# Patient Record
Sex: MALE | Race: WHITE | Employment: FULL TIME | URBAN - METROPOLITAN AREA
[De-identification: names, ages, dates, MRNs, and addresses within clinical notes are randomized per-mention and may not be internally consistent; named-entity substitution may affect disease eponyms.]

---

## 2019-03-05 ENCOUNTER — OFFICE VISIT (OUTPATIENT)
Dept: ORTHOPEDIC SURGERY | Age: 64
End: 2019-03-05
Payer: COMMERCIAL

## 2019-03-05 VITALS — SYSTOLIC BLOOD PRESSURE: 175 MMHG | HEART RATE: 72 BPM | DIASTOLIC BLOOD PRESSURE: 87 MMHG

## 2019-03-05 DIAGNOSIS — M25.532 LEFT WRIST PAIN: Primary | ICD-10-CM

## 2019-03-05 DIAGNOSIS — S63.502A SPRAIN OF LEFT WRIST, INITIAL ENCOUNTER: ICD-10-CM

## 2019-03-05 PROCEDURE — 99203 OFFICE O/P NEW LOW 30 MIN: CPT | Performed by: ORTHOPAEDIC SURGERY

## 2019-03-05 PROCEDURE — L3908 WHO COCK-UP NONMOLDE PRE OTS: HCPCS | Performed by: ORTHOPAEDIC SURGERY

## 2019-03-05 RX ORDER — IBUPROFEN 800 MG/1
800 TABLET ORAL EVERY 8 HOURS PRN
Qty: 60 TABLET | Refills: 0 | Status: SHIPPED | OUTPATIENT
Start: 2019-03-05

## 2019-04-02 ENCOUNTER — OFFICE VISIT (OUTPATIENT)
Dept: ORTHOPEDIC SURGERY | Age: 64
End: 2019-04-02
Payer: COMMERCIAL

## 2019-04-02 DIAGNOSIS — S63.502A SPRAIN OF LEFT WRIST, INITIAL ENCOUNTER: Primary | ICD-10-CM

## 2019-04-02 DIAGNOSIS — M77.8 TENDINITIS OF LEFT WRIST: ICD-10-CM

## 2019-04-02 PROCEDURE — 99213 OFFICE O/P EST LOW 20 MIN: CPT | Performed by: ORTHOPAEDIC SURGERY

## 2019-04-03 RX ORDER — DEXAMETHASONE SODIUM PHOSPHATE 4 MG/ML
4 INJECTION, SOLUTION INTRA-ARTICULAR; INTRALESIONAL; INTRAMUSCULAR; INTRAVENOUS; SOFT TISSUE PRN
Qty: 30 ML | Refills: 0 | Status: SHIPPED | OUTPATIENT
Start: 2019-04-03

## 2019-04-03 NOTE — PROGRESS NOTES
Assessment: 67-year-old male presenting with approximately 5 week history of left ulnar-sided wrist pain next on 1. Suspect ECU tendon pathology including tendinitis versus sub-sheath injury, possible TFCC pathology        Treatment Plan: With repeat examination and do suspect a component of ECU tendinopathy related to the patient's symptoms. We did discuss the possibility of ECU tendon tear as well as other intra-articular pathology such as TFCC tear within the wrist.  The patient does also have some ulnar positive variance the could be contributing to his symptoms as well  Overall, he has experienced some improvement with intermittent resting as well as use of a brace. On examination it does seem the most his symptoms are related to the ECU tendon and I have suggested to him further conservative management consisting of therapy for ECU stretching and modification as well as iontophoresis modality. We did discuss obtaining an MRI for further evaluation in the future as well as well as operative treatment options but the patient would much prefer nonoperative treatment at this point  We will plan to escalate this most recent treatment and plan for follow-up in approximately 1 month for repeat evaluation    No follow-ups on file. History of Present Illness  Peyton Fritz is a 58 y.o. male, right-hand-dominant, here with certified  with work-related left wrist pain and injury. Date of injury: Approximately 2/1/2019  Mechanism of injury: The patient was throwing garbage when he twisted his left wrist awkwardly to lift a garbage can and let also fell awkwardly onto his wrist.  He has had ulnar-sided wrist pain since that time which she describes as occasionally radiating into his distal forearm.   I evaluated him approximately 1 month ago with symptoms consistent with ECU tendon pathology, also some ulnar foveal pain and possible intra-articular ulnar-sided pathology  The patient has been wearing a brace and modified some of his activities. He does continue to work. Overall, he feels some improvement in his symptoms. He does have some pain especially when he is not using his brace. Most of his pain is related to pronation and supination especially. He occasionally has swelling on the ulnar side of his wrist  No additional changes reported today, numbness and tingling have improved        Review of Systems  Pertinent items are noted in HPI  Review of systems reviewed from Patient History Form dated on 3/5/19 and available in the patient's chart under the Media tab. Vital Signs  There were no vitals filed for this visit. Physical Exam  Constitutional:  Patient is well-nourished and demonstrates normal hygiene. Mental Status:  Patient is alert and oriented to person, place and time. Skin:  Intact, no rashes or lesions.      Left Wrist/left upper extremity Examination:     Inspection:  Minimal swelling of the left ulnar wrist without skin changes or open wounds, no obvious deformity or asymmetry compared with right wrist     Palpation: Focal tenderness to palpation along ECU tendon sheath as well as mild ulnar fovea tenderness and tenderness near pisotriquetral joint        Range of Motion:  Near symmetrical range of motion with pronation supination with 70° and 70°, extension and flexion 60° and 70° near symmetrical to contralateral wrist  Full composite fist and extension of all fingers without discomfort   There is pain with terminal supination and combined wrist extension    Strength:  5 out of 5 strength wrist flexion and extension, finger flexion-extension abduction  5 out of 5 EPL FPL and thumb abduction     Special Tests:  Positive ECU Synergy test  Stable distal radial ulnar joint in pronation supination and neutral position with mild discomfort with palpation  Mild discomfort with axial loading and ulnar deviation of wrist  Tenderness along ECU tendon with no subluxation or instability  Globally nontender throughout remainder of wrist  Skin: There are no rashes, ulcerations or lesions. Capillary refill brisk all fingers, symmetric  Gross sensation intact to light touch median/ulnar/radial nerves  Sensation intact to radial/ulnar aspect of fingertip        Radiology:    X-rays images from 3/5/2019  Views 3  Location left wrist including pronated oblique comparison view  Impression no acute fracture or subacute osseous abnormalities. Approximately 2 mm ulnar positive variance symmetrical to contralateral wrist with prominent ulnar styloid, no evidence of DRUJ arthrosis or additional degenerative changes      Additional Diagnostic Test Findings:    Office Procedures:  Orders Placed This Encounter   Procedures    [facility] OT - [facility] Occupational Therapy     Referral Priority:   Routine     Referral Type:   Eval and Treat     Referral Reason:   Specialty Services Required     Requested Specialty:   Occupational Therapy     Number of Visits Requested:   1           Carmel Rodriguez MD  Orthopaedic Surgeon, [address]    Contact Information:  Michell Rafaelashyam: [phone] [phone] Clinical )    This dictation was performed with a verbal recognition program [facility]) and it was checked for errors. It is possible that there are still dictated errors within this office note. If so, please bring any errors to my attention for an addendum. All efforts were made to ensure that this office note is accurate.

## 2019-04-10 ENCOUNTER — TELEPHONE (OUTPATIENT)
Dept: ORTHOPEDIC SURGERY | Age: 64
End: 2019-04-10

## 2019-04-10 NOTE — TELEPHONE ENCOUNTER
RETURNED PATIENT DAUGHTER CALL- STATES SHE WENT TO [facility] TO GET DEXAMETHASONE FILLED & WAS TOLD BY PHARMACIST THEY'RE UNABLE TO FILL IT PER WORKERS COMP

## 2019-04-10 NOTE — TELEPHONE ENCOUNTER
CALLED [facility] PHARMACY SPOKE WITH CASTRO - STATES PER WORKERS COMP DEXAMETHASONE WON'T BE COVERED DUE TO DOI IS PAST  60 DAYS. CASTROCLARA RG THEY NEED A PRIOR AUTH OR EXPLANATION BY PHYSICIAN IN ORDER TO GET IT APPROVED .  STATES OUR OFFICE NEEDS TO CONTACT #[phone]

## 2019-04-12 ENCOUNTER — TELEPHONE (OUTPATIENT)
Dept: ORTHOPEDIC SURGERY | Age: 64
End: 2019-04-12

## 2019-04-15 ENCOUNTER — TELEPHONE (OUTPATIENT)
Dept: ORTHOPEDIC SURGERY | Age: 64
End: 2019-04-15

## 2019-04-23 ENCOUNTER — HOSPITAL ENCOUNTER (OUTPATIENT)
Dept: OCCUPATIONAL THERAPY | Age: 64
Setting detail: THERAPIES SERIES
Discharge: HOME | End: 2019-04-23
Payer: COMMERCIAL

## 2019-04-23 PROCEDURE — 97165 OT EVAL LOW COMPLEX 30 MIN: CPT | Performed by: OCCUPATIONAL THERAPIST

## 2019-04-23 PROCEDURE — 97140 MANUAL THERAPY 1/> REGIONS: CPT | Performed by: OCCUPATIONAL THERAPIST

## 2019-04-23 PROCEDURE — 97033 APP MDLTY 1+IONTPHRSIS EA 15: CPT | Performed by: OCCUPATIONAL THERAPIST

## 2019-04-23 PROCEDURE — 97110 THERAPEUTIC EXERCISES: CPT | Performed by: OCCUPATIONAL THERAPIST

## 2019-04-23 NOTE — PLAN OF CARE
[address] [place] [address]  [facility] [address]  Phone [phone]      Fax [phone]      Occupational Therapy/Hand Therapy Certification  Dear Referring Practitioner: Carmel Rodriguez,     We had the pleasure of evaluating the following patient for occupational therapy services at [address] [place] [address]. A summary of our findings can be found in the initial assessment below. This includes our plan of care. If you have any questions or concerns regarding these findings, please do not hesitate to contact me at the office phone number checked above. Thank you for the referral.     Physician Signature:_______________________________Date:__________________  By signing above (or electronic signature), therapists plan is approved by physician      Patient: Roberto De Luna   : 1955   MRN: [id number]  Referring Physician: Referring Practitioner: Carmel Rodriguez      Evaluation Date: 2019      Medical Diagnosis Information:  Diagnosis: L wrist sprain  S63.502A    Treatment Diagnosis: L wrist pain  M25.532                  Insurance information: OT Insurance Information: [facility]  12 visits  -19  Date of Injury: 19  Date of Surgery: NA      Precautions/ Contra-indications:  Latex Allergy:  [x]No      []Yes  Pacemaker:  [x] No       [] Yes     Preferred Language for Healthcare:   [x]English       []other:      G-Codes:  OT G-codes  Functional Assessment Tool Used: Quick DASH  Score: 20%    [x] Patient reported history, allergies, and medications reviewed - see intake form. SUBJECTIVE:  Background/Relevant Medical & Therapy History:   Pt fell while carrying garbage cans and work.   Garbage can hit L wrist    Pain Scale: 4 /10   []Constant      [x]Intermittent    [x]other: 8-9 with usage  Pain Location:  Ulnar wrist  Easing factors: resting in brace, ibuprofen  Provocative factors: usage - wrist ext, Mobility/Transfers/Gait: [x] Independent - no significant gait deviations  [] Assistance needed   [] Assistive device used: Falls Risk Assessment (30 days):   [x] Falls Risk assessed and no intervention required. [] Falls Risk assessed and Patient requires intervention due to being higher risk   TUG score (>12s at risk):     [] Falls education provided, including      Review Of Systems (ROS): [x]Performed Review of systems (Integumentary, CardioPulmonary, Neurological) by intake and observation. Intake form has been scanned into medical record. Patient has been instructed to contact their primary care physician regarding ROS issues if not already being addressed at this time. ASSESSMENT:   This patient presents with signs and symptoms consistent with the medical diagnosis provided by the referring physician. Impairments (physical, cognitive and/or psychosocial):  [x] Decreased mobility   [x] Weakness    [] Hypersensitivity   [x] Pain/tenderness   [] Edema/swelling   [] Decreased coordination (fine/gross motor)   [] Impaired body mechanics  [] Sensory loss  [] Loss of balance   [] Other:      Performance Deficits (to be addressed in plan of care):   [] Bathing    [] Household Tasks (cooking/cleaning)   [] Dressing    [] Self Feeding   [] Grooming    [x] Work/Education- pulling garbage cans   [] Functional Mobility   [] Sleeping/Rest   [] Toileting/Hygiene   [] Recreational Activities   [] Driving    [] Community/Social Participation   [x] Other:  Maintained  and lifting    Rehab Potential:   [] Excellent [x] Good [] Fair  [] Poor     Barriers affecting rehab potential:  []Age    []Lack of Motivation   []Co-Morbidities  []Cognitive Function  []Environmental/home/work barriers  []Other:     Tolerance of evaluation/treatment:    [] Excellent [x] Good [] Fair  [] Poor      PLAN OF CARE:  Interventions:   [x] Therapeutic Exercise [x] Therapeutic Activity    [x] Activities of Daily Living [x] Neuromuscular Re-education      [x] Patient Education  [x] Manual Therapy      [x] Modalities as needed, and not otherwise contraindicated, including: ultrasound,paraffin,moist heat/cold pack, electrical stimulation, contrast bath, iontophoresis  [x] Splinting    Frequency/Duration: 2 days per week for 6 weeks      GOALS:  Short Term Goals: To be achieved in: 2 weeks  1. Independent in HEP and progression per patient tolerance, in order to prevent re-injury. 2. Patient will have a decrease in pain to facilitate improvement in movement, function, and ADLs as indicated by Functional Deficits. Long Term Goals to be achieved in 6  weeks, including patient directed goals to address identified performance deficits:  1) Pt to be independent in graded HEP progression with a good level of effort and compliance. 2) Pt to report a score of 15 % or less on the Quick DASH disability questionnaire for increased performance with carrying, moving, and handling objects. 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   4) Pt will demonstrate increased strength to L  within 10# of R and wrist strength 5-/5 for improved performance maintained  and lifting allowing pt to perform job  5) Pt will have a decrease in pain to 2/10 to facilitate improvement in  performance maintained , lifting and pulling        OCCUPATIONAL THERAPY EVALUATION COMPLEXITY JUSTIFICATION:    [] An occupational profile and medical/therapy history, which includes:   [x] a brief history including medical and/or therapy records relating to the     presenting problem   [] an expanded review of medical and/or therapy records and additional review     of physical, cognitive or psychosocial history related to current functional    performance   [] an extensive additional review of review of medical and/or therapy records   and physical, cognitive, or psychosocial history related to current    functional performance    [] An assessment that identifies performance deficits (relating to physical, cognitive, or psychosocial skills) that result in activity limitations and/or participation restrictions:   [] 1-3 performance deficits   [x] 3-5 performance deficits   [] 5 or more performance deficits    [] Clinical decision making of:   [x] low complexity, including analysis of occupational profile, data analysis from problem focused assessment, and consideration of a limited number of treatment options. No comorbidities affect occupational performance. No task modifications or assistance needed to complete evaluation. [] moderate complexity, including analysis of occupational profile, data analysis from detailed assessment and consideration of several treatment options. Comorbidities that affect occupational performance may be present. Minimal to moderate task modifications or assistance needed to complete assessment. [] high complexity, including analysis of occupational profile, analysis of data from comprehensive assessment and consideration of multiple treatment options. Multiple comorbidities present that affect occupational performance. Significant task modifications or assistance needed to complete assessment. Evaluation Code:  [] Low Complexity EVAL 39704 (typically 30 minutes face to face)  [] Mod Complexity EVAL 63630 (typically 45 minutes face to face)  [] High Complexity EVAL 37639 (typically 60 minutes face to face)             Electronically signed by:   Becca Rodriguez OTR/L [address] [place] [address]  OT [id number]

## 2019-04-23 NOTE — FLOWSHEET NOTE
[address] [place], [place] [address]  Phone [phone]      Fax [phone]    Hand Therapy Daily Treatment Note  Date:  2019    Patient: Bradly Clinton   : 1955   MRN: [id number]  Referring Physician: Referring Practitioner: Mark Kinney       Medical Diagnosis Information:  Diagnosis: L wrist sprain  S63.502A    Treatment Diagnosis: L wrist pain  M25.532                                         Insurance information: OT Insurance Information: [facility]  12 visits  -19  Date of Injury:19  Date of Surgery:NA      Visit # Insurance Allowable   1 12     Date of Patient follow up with Physician:     G-Codes:  OT G-codes  Functional Assessment Tool Used: Quick DASH  Score: 20%    Progress Note: []  Yes  [x]  No  Next due by: Visit #10      Latex Allergy:  [x]NO      []YES    Preferred Language for Healthcare:   [x]English       []other:    Pain level:  4/10     SUBJECTIVE:    Background/Relevant Medical & Therapy History:   Pt fell while carrying garbage       RESTRICTIONS/PRECAUTIONS: avoid sup    OBJECTIVE:      Date: 19       Objective Measures:        Wrist   Ext/Flex            RD/UD L  65/40  14/25  pain    R  65/65  20/30       Forearm sup/pron L  60/85    R  75/90          L  20#    R 90#         MMT:   Wrist ext  Wrist flex    L  4-/5  4/5  shooting pain                     Modalities: 19         HVGS with CP  End  10\"       Ionto 120 MaMin                               Therapeutic Exercise & Activities:        Stretching FA   Wrist ext  Wrist flex  RD  UD  5X15 sec hold ea         Manual DTM  Pt painful distal 1/3 of ulnar forearm                                                                    Therapeutic Exercise and NMR:  [x] (84840) Provided verbal/tactile cueing for activities related to strengthening, flexibility, endurance, ROM  for improvements in scapular, scapulothoracic and UE control with self care, reaching, carrying, lifting, house/yardwork, driving/computer work.    [] (51157) Provided verbal/tactile cueing for activities related to improving balance, coordination, kinesthetic sense, posture, motor skill, proprioception  to assist with  scapular, scapulothoracic and UE control with self care, reaching, carrying, lifting, house/yardwork, driving/computer work.   [] Comments:    Therapeutic Activities:    [] (70854 or 93153) Provided verbal/tactile cueing for activities related to improving balance, coordination, kinesthetic sense, posture, motor skill, proprioception and motor activation to allow for proper function of scapular, scapulothoracic and UE control with self care, carrying, lifting, driving/computer work  [] Comments:    Home Exercise Program:    [x] (67780) Reviewed/Progressed HEP activities related to strengthening, flexibility, endurance, ROM of scapular, scapulothoracic and UE control with self care, reaching, carrying, lifting, house/yardwork, driving/computer work  [] (83797) Reviewed/Progressed HEP activities related to improving balance, coordination, kinesthetic sense, posture, motor skill, proprioception of scapular, scapulothoracic and UE control with self care, reaching, carrying, lifting, house/yardwork, driving/computer work    [x] Comments:handouts    Manual Treatments:  PROM / STM / Oscillations-Mobs:  G-I, II, III, IV (PA's, Inf., Post.)  [x] (21284) Provided manual therapy to mobilize soft tissue/joints of cervical/CT, scapular GHJ and UE for the purpose of modulating pain, promoting relaxation,  increasing ROM, reducing/eliminating soft tissue swelling/inflammation/restriction, improving soft tissue extensibility and allowing for proper ROM for normal function with self care, reaching, carrying, lifting, house/yardwork, driving/computer work  [] Comments:    ADL Training:  []  (00835) Provided self-care/home management training related to activities of daily living and compensatory training, and/or use of adaptive equipment   [] Comments:     Splinting:  [] Fabrication of:   [] (03480) Checkout for orthotic/prosthetic use, established patient   [] (47644) Orthotic management and training (fitting and assessment)  [] Comments:    Charges:  Timed Code Treatment Minutes: 38   Total Treatment Minutes: 65   9:20-10:25 am  [x] EVAL (LOW) 09418   [] OT Re-eval (47698)  [] EVAL (MOD) 75117   [] EVAL (HIGH) 29117       [x] Ulysses (94023) x  1 20 min  [x] ESCRB(83376) 8'  [] NMR (23692) x      [] Estim (attended) (22715)   [x] Manual ([id number]) x  1   10' [] US (52982)  [] TA (62529) x      [] Paraffin (59797)  [] ADL  ([phone]) x     [] Splint/L code:    [] Estim (unattended) (22 661984)  [] Other:      Frequency/Duration: 2 days per week for 6 weeks        GOALS:  Short Term Goals: To be achieved in: 2 weeks  1. Independent in HEP and progression per patient tolerance, in order to prevent re-injury. 2. Patient will have a decrease in pain to facilitate improvement in movement, function, and ADLs as indicated by Functional Deficits.     Long Term Goals to be achieved in 6  weeks, including patient directed goals to address identified performance deficits:  1) Pt to be independent in graded HEP progression with a good level of effort and compliance. 2) Pt to report a score of 15 % or less on the Quick DASH disability questionnaire for increased performance with carrying, moving, and handling objects. 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   4) Pt will demonstrate increased strength to L  within 10# of R and wrist strength 5-/5 for improved performance maintained  and lifting allowing pt to perform job  5) Pt will have a decrease in pain to 2/10 to facilitate improvement in  performance maintained , lifting and pulling            Progression Towards Functional goals:  [] Patient is progressing as expected towards functional goals listed. [] Progression is slowed due to complexities listed. [] Progression has been slowed due to co-morbidities. [x] Plan just implemented, too soon to assess goals progression  [] Other:     ASSESSMENT:  See eval    Treatment/Activity Tolerance:  [] Patient tolerated treatment well [] Patient limited by fatique  [] Patient limited by pain  [] Patient limited by other medical complications  [] Other:     Prognosis: [x] Good [] Fair  [] Poor    Patient Requires Follow-up: [x] Yes  [] No    PLAN: See eval  [] Continue per plan of care [] Alter current plan (see comments)  [x] Plan of care initiated [] Hold pending MD visit [] Discharge    Electronically signed by:  Danuta Roche OTR/L [place]  OT [id number]

## 2019-04-26 ENCOUNTER — HOSPITAL ENCOUNTER (OUTPATIENT)
Dept: OCCUPATIONAL THERAPY | Age: 64
Setting detail: THERAPIES SERIES
Discharge: HOME | End: 2019-04-26
Payer: COMMERCIAL

## 2019-04-26 PROCEDURE — 97033 APP MDLTY 1+IONTPHRSIS EA 15: CPT | Performed by: OCCUPATIONAL THERAPIST

## 2019-04-26 PROCEDURE — 97140 MANUAL THERAPY 1/> REGIONS: CPT | Performed by: OCCUPATIONAL THERAPIST

## 2019-04-26 PROCEDURE — 97110 THERAPEUTIC EXERCISES: CPT | Performed by: OCCUPATIONAL THERAPIST

## 2019-04-26 NOTE — FLOWSHEET NOTE
[address] [place], [place] [address]  Phone [phone]      Fax [phone]    Hand Therapy Daily Treatment Note  Date:  2019    Patient: Ciro Pratt   : 1955   MRN: [id number]  Referring Physician: Referring Practitioner: Susanne Lopez       Medical Diagnosis Information:  Diagnosis: L wrist sprain  S63.502A    Treatment Diagnosis: L wrist pain  M25.532                                         Insurance information: OT Insurance Information: [facility]  12 visits  -19  Date of Injury:19  Date of Surgery:NA      Visit # Insurance Allowable   2 12     Date of Patient follow up with Physician:     G-Codes:  OT G-codes  Functional Assessment Tool Used: Quick DASH  Score: 20%    Progress Note: []  Yes  [x]  No  Next due by: Visit #10      Latex Allergy:  [x]NO      []YES    Preferred Language for Healthcare:   [x]English       []other:    Pain level:  4/10  Current:-5/10    SUBJECTIVE:  No change in status noted per patient. Has had shooting pains in mid volar wrist since last therapy which he had not previously experienced.         Background/Relevant Medical & Therapy History:   Pt fell while carrying garbage       RESTRICTIONS/PRECAUTIONS: avoid sup    OBJECTIVE:      Date: 19       Objective Measures:        Wrist   Ext/Flex            RD/UD L  65/40  14/25  pain    R  65/65  20/30       Forearm sup/pron L  60/85    R  75/90          L  20#    R 90#         MMT:   Wrist ext  Wrist flex    L  4-/5  4/5  shooting pain                     Modalities: 19      HVGS with CP  End  10\" With HP  10'      Ionto 120 MaMin 120 MaMin                              Therapeutic Exercise & Activities:        Stretching FA   Wrist ext  Wrist flex  RD  UD  5X15 sec hold ea   FA   Wrist ext  Wrist flex  RD  UD  5X15 sec hold ea      Manual DTM  Pt painful distal 1/3 of ulnar forearm  DTM volar and dorsal FA      Ice massage  5'      Exercises  Ecc wrist flex  1#  8X  Too painful    Red putty flatten with mallet    Trial of wrist wrap with mallet and putty as well as sup/pron with dowel. Pt did not experience any pain relief with either task                                                              Therapeutic Exercise and NMR:  [x] (49313) Provided verbal/tactile cueing for activities related to strengthening, flexibility, endurance, ROM  for improvements in scapular, scapulothoracic and UE control with self care, reaching, carrying, lifting, house/yardwork, driving/computer work. [x] (19322) Provided verbal/tactile cueing for activities related to improving balance, coordination, kinesthetic sense, posture, motor skill, proprioception  to assist with  scapular, scapulothoracic and UE control with self care, reaching, carrying, lifting, house/yardwork, driving/computer work.   [] Comments:    Therapeutic Activities:    [] (47429 or 05008) Provided verbal/tactile cueing for activities related to improving balance, coordination, kinesthetic sense, posture, motor skill, proprioception and motor activation to allow for proper function of scapular, scapulothoracic and UE control with self care, carrying, lifting, driving/computer work  [] Comments:    Home Exercise Program:    [x] (23745) Reviewed/Progressed HEP activities related to strengthening, flexibility, endurance, ROM of scapular, scapulothoracic and UE control with self care, reaching, carrying, lifting, house/yardwork, driving/computer work  [] (33743) Reviewed/Progressed HEP activities related to improving balance, coordination, kinesthetic sense, posture, motor skill, proprioception of scapular, scapulothoracic and UE control with self care, reaching, carrying, lifting, house/yardwork, driving/computer work    [x] Comments:handouts    Manual Treatments:  PROM / STM / Oscillations-Mobs:  G-I, II, III, IV (PA's, Inf., Post.)  [x] (97235) and handling objects. 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 4) Pt will demonstrate increased strength to L  within 10# of R and wrist strength 5-/5 for improved performance maintained  and lifting allowing pt to perform job  5) Pt will have a decrease in pain to 2/10 to facilitate improvement in  performance maintained , lifting and pulling            Progression Towards Functional goals:  [] Patient is progressing as expected towards functional goals listed. [] Progression is slowed due to complexities listed. [] Progression has been slowed due to co-morbidities. [x] Plan just implemented, too soon to assess goals progression  [] Other:     ASSESSMENT: Pain with resisted wrist ext & flex along ECU and FCU. Pain also with sup. Feel addition of volar mid wrist pain may be due to pt applying too much pressure during stretches. Treatment/Activity Tolerance:  [] Patient tolerated treatment well [] Patient limited by fatique  [x] Patient limited by pain  [] Patient limited by other medical complications  [] Other:     Prognosis: [x] Good [] Fair  [] Poor    Patient Requires Follow-up: [x] Yes  [] No    PLAN: Progress strengthening as pain allows  [x] Continue per plan of care [] Alter current plan (see comments)  [] Plan of care initiated [] Hold pending MD visit [] Discharge    Electronically signed by:  Ran Lerner OTR/L [facility]  OT [id number]

## 2019-05-02 ENCOUNTER — HOSPITAL ENCOUNTER (OUTPATIENT)
Dept: OCCUPATIONAL THERAPY | Age: 64
Setting detail: THERAPIES SERIES
Discharge: HOME | End: 2019-05-02
Payer: COMMERCIAL

## 2019-05-02 PROCEDURE — 97140 MANUAL THERAPY 1/> REGIONS: CPT | Performed by: OCCUPATIONAL THERAPIST

## 2019-05-02 PROCEDURE — 97022 WHIRLPOOL THERAPY: CPT | Performed by: OCCUPATIONAL THERAPIST

## 2019-05-02 PROCEDURE — 97110 THERAPEUTIC EXERCISES: CPT | Performed by: OCCUPATIONAL THERAPIST

## 2019-05-02 PROCEDURE — 97033 APP MDLTY 1+IONTPHRSIS EA 15: CPT | Performed by: OCCUPATIONAL THERAPIST

## 2019-05-02 NOTE — FLOWSHEET NOTE
[address] [place], [place] [address]  Phone [phone]      Fax [phone]    Hand Therapy Daily Treatment Note  Date:  2019    Patient: Riley Brewer   : 1955   MRN: [id number]  Referring Physician: Referring Practitioner: Jesus Carney       Medical Diagnosis Information:  Diagnosis: L wrist sprain  S63.502A    Treatment Diagnosis: L wrist pain  M25.532                                         Insurance information: OT Insurance Information: [facility]  12 visits  -19  Date of Injury:19  Date of Surgery:NA      Visit # Insurance Allowable   3 12     Date of Patient follow up with Physician: 19    Functional Assessment Tool Used: Quick DASH  Score: 20% disability    Progress Note: []  Yes  [x]  No  Next due by: Visit #10      Latex Allergy:  [x]NO      []YES    Preferred Language for Healthcare:   []English       [x]other:    Pain level:  4/10  Current:-5/10    SUBJECTIVE:  Daughter present to interpret for patient. Nothing different, pain is the same. Patient reports the he has pain on the bone. Points to ulnar side of left wrist over ulnar head.   Reports that holding the broom and dustpan in left hand for about 5 minutes felt excruciating pain in left wrist.  Background/Relevant Medical & Therapy History:   Pt fell while carrying garbage       RESTRICTIONS/PRECAUTIONS: avoid sup    OBJECTIVE:      Date: 19      Objective Measures:            Wrist   Ext/Flex                RD/UD L  65/40      14/25  pain    R  65/65  20/30   70/45 9 (pain with wrist extension)  20/30                Forearm sup/pron L  60/85    R  75/90     65/85       L  20#    R 90#     50# ( 5 pain)    90#      MMT:   Wrist ext  Wrist flex    L  4-/5  4/5  shooting pain                     Modalities: 19     HVGS with CP  End  10\" With HP  10' CP end of treatment 10'     Ionto 120 MaMin 120 MaMin 120 ma/min take home patch to left ulnar wrist.        Fluidotherapy with AROM to left wrist 15'. Therapeutic Exercise & Activities:        Stretching FA   Wrist ext  Wrist flex  RD  UD  5X15 sec hold ea   FA   Wrist ext  Wrist flex  RD  UD  5X15 sec hold ea      Manual DTM  Pt painful distal 1/3 of ulnar forearm  DTM volar and dorsal FA      Ice massage  5'      Exercises  Ecc wrist flex  1#  8X  Too painful    Red putty flatten with mallet    Trial of wrist wrap with mallet and putty as well as sup/pron with dowel. Pt did not experience any pain relief with either task     1 lb. Wrist extension x 10. RD/Ud x 10, wrist flex x 10. Most pain ulnar wrist with wrist flexion gentle supinated forearm. Gripping and rotating green foam cube x 20. Isometric wrist exercises. Flex/ext, RD/UD see handout. Instructed in for home and issued green foam for resisted  below pain threshold. Therapeutic Exercise and NMR:  [x] (45124) Provided verbal/tactile cueing for activities related to strengthening, flexibility, endurance, ROM  for improvements in scapular, scapulothoracic and UE control with self care, reaching, carrying, lifting, house/yardwork, driving/computer work. [x] (74192) Provided verbal/tactile cueing for activities related to improving balance, coordination, kinesthetic sense, posture, motor skill, proprioception  to assist with  scapular, scapulothoracic and UE control with self care, reaching, carrying, lifting, house/yardwork, driving/computer work.   [] Comments:    Therapeutic Activities:    [] (17815 or 96866) Provided verbal/tactile cueing for activities related to improving balance, coordination, kinesthetic sense, posture, motor skill, proprioception and motor activation to allow for proper function of scapular, scapulothoracic and UE control with self care, carrying, lifting, driving/computer work  [] fluidotherapy      Frequency/Duration: 2 days per week for 6 weeks        GOALS:  Short Term Goals: To be achieved in: 2 weeks  1. Independent in HEP and progression per patient tolerance, in order to prevent re-injury. 2. Patient will have a decrease in pain to facilitate improvement in movement, function, and ADLs as indicated by Functional Deficits.     Long Term Goals to be achieved in 6  weeks, including patient directed goals to address identified performance deficits:  1) Pt to be independent in graded HEP progression with a good level of effort and compliance. 2) Pt to report a score of 15 % or less on the Quick DASH disability questionnaire for increased performance with carrying, moving, and handling objects. 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 4) Pt will demonstrate increased strength to L  within 10# of R and wrist strength 5-/5 for improved performance maintained  and lifting allowing pt to perform job  5) Pt will have a decrease in pain to 2/10 to facilitate improvement in  performance maintained , lifting and pulling            Progression Towards Functional goals:  [] Patient is progressing as expected towards functional goals listed. [] Progression is slowed due to complexities listed. [] Progression has been slowed due to co-morbidities. [] Plan just implemented, too soon to assess goals progression  [x] Other: Better wrist ROM and  strength but pain on ulnar side of wrist has remained unchanged.     ASSESSMENT:     Treatment/Activity Tolerance:  [] Patient tolerated treatment well [] Patient limited by fatique  [x] Patient limited by pain  [] Patient limited by other medical complications  [] Other:     Prognosis: [x] Good [] Fair  [] Poor    Patient Requires Follow-up: [x] Yes  [] No    PLAN: Progress strengthening as pain allows, sees MD on 5/8/19, await MD recommendations.   [x] Continue per plan of care [] Alter current plan (see comments)  [] Plan of care initiated [] Hold pending MD visit [] Discharge    Electronically signed by: Simon Gould [facility], [address] [place] [id number]

## 2019-05-07 ENCOUNTER — HOSPITAL ENCOUNTER (OUTPATIENT)
Dept: OCCUPATIONAL THERAPY | Age: 64
Setting detail: THERAPIES SERIES
Discharge: HOME | End: 2019-05-07
Payer: COMMERCIAL

## 2019-05-07 ENCOUNTER — OFFICE VISIT (OUTPATIENT)
Dept: ORTHOPEDIC SURGERY | Age: 64
End: 2019-05-07
Payer: COMMERCIAL

## 2019-05-07 DIAGNOSIS — S63.502A SPRAIN OF LEFT WRIST, INITIAL ENCOUNTER: Primary | ICD-10-CM

## 2019-05-07 DIAGNOSIS — S69.92XA INJURY OF LEFT WRIST, INITIAL ENCOUNTER: ICD-10-CM

## 2019-05-07 PROCEDURE — 99213 OFFICE O/P EST LOW 20 MIN: CPT | Performed by: ORTHOPAEDIC SURGERY

## 2019-05-07 PROCEDURE — 97022 WHIRLPOOL THERAPY: CPT | Performed by: OCCUPATIONAL THERAPIST

## 2019-05-07 PROCEDURE — 97110 THERAPEUTIC EXERCISES: CPT | Performed by: OCCUPATIONAL THERAPIST

## 2019-05-07 PROCEDURE — 97033 APP MDLTY 1+IONTPHRSIS EA 15: CPT | Performed by: OCCUPATIONAL THERAPIST

## 2019-05-07 PROCEDURE — 97140 MANUAL THERAPY 1/> REGIONS: CPT | Performed by: OCCUPATIONAL THERAPIST

## 2019-05-07 NOTE — FLOWSHEET NOTE
[address] [place] [address] [address] [address]  Phone [phone]      Fax [phone]    Hand Therapy Daily Treatment Note  Date:  2019    Patient: Ramu Michael   : 1955   MRN: [id number]  Referring Physician: Referring Practitioner: Isa Mcrae Diagnosis Information:  Diagnosis: L wrist sprain  S63.502A    Treatment Diagnosis: L wrist pain  M25.532                                         Insurance information: OT Insurance Information: [facility]  12 visits  -19  Date of Injury:19  Date of Surgery:NA      Visit # Insurance Allowable   4      Date of Patient follow up with Physician: 19    Functional Assessment Tool Used: Quick DASH  Score: 20% disability    Progress Note: []  Yes  [x]  No  Next due by: Visit #10      Latex Allergy:  [x]NO      []YES    Preferred Language for Healthcare:   []English       [x]other:    Pain level:  4/10  Current:3-4/10    SUBJECTIVE:   19   Daughter cont to be  present to interpret for patient  Saw MD and will be getting a MRI     19Daughter present to interpret for patient. Nothing different, pain is the same. Patient reports the he has pain on the bone. Points to ulnar side of left wrist over ulnar head.   Reports that holding the broom and dustpan in left hand for about 5 minutes felt excruciating pain in left wrist.  Background/Relevant Medical & Therapy History:   Pt fell while carrying garbage       RESTRICTIONS/PRECAUTIONS: avoid sup    OBJECTIVE:      Date: 19      Objective Measures:            Wrist   Ext/Flex                RD/UD L  65/40      14/25  pain    R  65/65  20/30   70/45 9 (pain with wrist extension)  20/30                Forearm sup/pron L  60/85    R  75/90     65/85       L  20#    R 90#     50# ( 5 pain)    90#      MMT:   Wrist ext  Wrist flex    L  4-/5  4/5  shooting pain Modalities: 4/23/19 4/26/19 5/2/19 5/7/19    HVGS with CP  End  10\" With HP  10' CP end of treatment 10'     Ionto 120 MaMin 120 MaMin 120 ma/min take home patch to left ulnar wrist. 120 ma/min take home patch to left ulnar wrist.    Fluidotherapy   Fluidotherapy with AROM to left wrist 15'. Fluidotherapy  10'                    Therapeutic Exercise & Activities:        Stretching FA   Wrist ext  Wrist flex  RD  UD  5X15 sec hold ea   FA   Wrist ext  Wrist flex  RD  UD  5X15 sec hold ea  FA   Wrist ext  Wrist flex  5X15 sec hold ea    Manual DTM  Pt painful distal 1/3 of ulnar forearm  DTM volar and dorsal FA      Ice massage  5'  IASTM  Volar FA , wrist and hand. Pt tolerated well except for ulnar wrist,    Pain with wrist ext greater when fingers ext. Exercises  Ecc wrist flex  1#  8X  Too painful    Red putty flatten with mallet    Trial of wrist wrap with mallet and putty as well as sup/pron with dowel. Pt did not experience any pain relief with either task     1 lb. Wrist extension x 10. RD/Ud x 10, wrist flex x 10. Most pain ulnar wrist with wrist flexion gentle supinated forearm. Gripping and rotating green foam cube x 20. Isometric wrist exercises. Flex/ext, RD/UD  see handout. Instructed in for home and issued green foam for resisted  below pain threshold. Putty    and pull vert &   horizontal      Isometric wrist exercises. Flex/ext, RD/UD  5X 10 sec hold   verbal & tactile cuing required for proper technique                                                        Therapeutic Exercise and NMR:  [x] (40425) Provided verbal/tactile cueing for activities related to strengthening, flexibility, endurance, ROM  for improvements in scapular, scapulothoracic and UE control with self care, reaching, carrying, lifting, house/yardwork, driving/computer work.     [x] (60855) Provided verbal/tactile cueing for activities related to improving balance, coordination, kinesthetic sense, posture, and training (fitting and assessment)  [] Comments:    Charges:  Timed Code Treatment Minutes: 38   Total Treatment Minutes: 53   10:30- 11:23am  [] EVAL (LOW) 12445   [] OT Re-eval (02950)  [] EVAL (MOD) 98110   [] EVAL (HIGH) 40905       [x] Ulysses (08623) x  25' min  [x] IKDWT(97601) 8'  [] NMR (50129) x      [] Estim (attended) (62404)   [] Manual (26573) x    '       [] US (82627)  [] TA (40763) x      [] Paraffin (79440)  [] ADL  (92787) x     [] Splint/L code:    [] Estim (unattended) (51158)  [x] Other: fluidotherapy      Frequency/Duration: 2 days per week for 6 weeks        GOALS:  Short Term Goals: To be achieved in: 2 weeks  1. Independent in HEP and progression per patient tolerance, in order to prevent re-injury. 2. Patient will have a decrease in pain to facilitate improvement in movement, function, and ADLs as indicated by Functional Deficits.     Long Term Goals to be achieved in 6  weeks, including patient directed goals to address identified performance deficits:  1) Pt to be independent in graded HEP progression with a good level of effort and compliance. 2) Pt to report a score of 15 % or less on the Quick DASH disability questionnaire for increased performance with carrying, moving, and handling objects. 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 4) Pt will demonstrate increased strength to L  within 10# of R and wrist strength 5-/5 for improved performance maintained  and lifting allowing pt to perform job  5) Pt will have a decrease in pain to 2/10 to facilitate improvement in  performance maintained , lifting and pulling            Progression Towards Functional goals:  [] Patient is progressing as expected towards functional goals listed. [] Progression is slowed due to complexities listed. [] Progression has been slowed due to co-morbidities.   [] Plan just implemented, too soon to assess goals progression  [x] Other: Better wrist ROM and  strength but pain on ulnar side of wrist has remained unchanged. ASSESSMENT: Pt cont to have significant pain in ulnar wrist.  MD order MRI to check out TFCC and pisiform/triquetral OA    Treatment/Activity Tolerance:  [] Patient tolerated treatment well [] Patient limited by fatique  [x] Patient limited by pain  [] Patient limited by other medical complications  [] Other:     Prognosis: [x] Good [] Fair  [] Poor    Patient Requires Follow-up: [x] Yes  [] No    PLAN: Progress strengthening as pain allows, . [x] Continue per plan of care [] Alter current plan (see comments)  [] Plan of care initiated [] Hold pending MD visit [] Discharge    Electronically signed by:  Karlee Winter, [address] [facility] [id number]

## 2019-05-10 ENCOUNTER — HOSPITAL ENCOUNTER (OUTPATIENT)
Dept: OCCUPATIONAL THERAPY | Age: 64
Setting detail: THERAPIES SERIES
Discharge: HOME | End: 2019-05-10
Payer: COMMERCIAL

## 2019-05-10 NOTE — FLOWSHEET NOTE
[facility]      Occupational Therapy  Cancellation/No-show Note  Patient Name:  Riley Brewer   :  1955   Date:  5/10/2019  Cancelled visits to date:1  No-shows to date: 0    For today's appointment patient:  [x]    Cancelled  []    Rescheduled appointment  []    No-show     Reason given by patient:  []    Patient ill  []    Conflicting appointment  []    No transportation    [x]    Conflict with work  []    No reason given  []    Other:     Comments:      Electronically signed by:   [id number], OTR/L [address]

## 2019-05-13 ENCOUNTER — TELEPHONE (OUTPATIENT)
Dept: ORTHOPEDIC SURGERY | Age: 64
End: 2019-05-13

## 2019-05-13 NOTE — TELEPHONE ENCOUNTER
DAUGHTER CALLED TO CONFIRM IF MRI WAS APPROVED - PER NOTES WORKERS COMP APPROVED MRI. FAXED PAPERWORK TO [facility]. ADVISED DAUGHTER TO CONTACT [facility] TO GET AN APPOINTMENT SCHEDULED.  ONCE MRI IS COMPLETED PATIENT IS TO SCHEDULE AN APPT  TO DISCUSS RESULTS

## 2019-06-19 ENCOUNTER — OFFICE VISIT (OUTPATIENT)
Dept: ORTHOPEDIC SURGERY | Age: 64
End: 2019-06-19
Payer: COMMERCIAL

## 2019-06-19 DIAGNOSIS — S63.502A SPRAIN OF LEFT WRIST, INITIAL ENCOUNTER: Primary | ICD-10-CM

## 2019-06-19 PROCEDURE — 99213 OFFICE O/P EST LOW 20 MIN: CPT | Performed by: ORTHOPAEDIC SURGERY

## 2019-06-19 NOTE — PROGRESS NOTES
Assessment: 50-year-old male presenting with approximately 5 week history of left ulnar-sided wrist pain with likely ECU tendinitis and possible TFCC pathology    Treatment Plan: MRI reviewed and discussed with patient. I do think that the majority of his symptoms are related and correlated with MRI demonstrating ECU to peritendinitis and tendinosis. The patient feels that he is slowly improving. I did provide alternative options for bracing including wrist widget which I think will benefit the patient. He otherwise would like to continue with conservative management with activity modification and slow progression back to tolerated activities. If he is having any new symptoms he will plan to call, otherwise he is comfortable with the plan as we discussed today and will see me back on an as-needed basis    No follow-ups on file. History of Present Illness  Peyton Hickman is a 58 y. o. male, right-hand-dominant, here with certified  with work-related left wrist pain and injury. Date of injury: Approximately 2/1/2019  Mechanism of injury: The patient was throwing garbage when he twisted his left wrist awkwardly to lift a garbage can and let also fell awkwardly onto his wrist.  He has had ulnar-sided wrist pain since that time which she describes as occasionally radiating into his distal forearm. An MRI was obtained in the interval since his last visit  MRI findings in summary demonstrated a low-grade ECU tendinosis and peritendinitis without tear  Also reactive edema within the ulnar styloid    The patient has been slowly feeling some improvement but does continue to have some sharp intermittent pain in his ulnar wrist.  He has been occasionally using a wrist brace for protection and limitations of movements. Therapy has been of minimal benefit.   No other new complaints today      Review of Systems  Pertinent items are noted in HPI  Review of systems reviewed from Patient History Form dated on 3/5/19 and available in the patient's chart under the Media tab. Vital Signs  There were no vitals filed for this visit. Physical Exam  Constitutional:  Patient is well-nourished and demonstrates normal hygiene. Mental Status:  Patient is alert and oriented to person, place and time. Skin:  Intact, no rashes or lesions. Left Wrist/left upper extremity Examination:     Inspection:  No swelling of the left ulnar wrist without skin changes or open wounds, no obvious deformity or asymmetry compared with right wrist     Palpation: Mild Focal tenderness to palpation along ECU tendon sheath as well as mild ulnar fovea tenderness and minimal tenderness near pisotriquetral joint        Range of Motion:  Near symmetrical range of motion with pronation supination with 70° and 70°, extension and flexion 60° and 70° near symmetrical to contralateral wrist  Full composite fist and extension of all fingers without discomfort   There is pain with terminal supination and combined wrist extension     Strength:  5 out of 5 strength wrist flexion and extension, finger flexion-extension abduction  5 out of 5 EPL FPL and thumb abduction     Special Tests:  Positive ECU Synergy test  Stable distal radial ulnar joint in pronation supination and neutral position with mild discomfort with palpation  Mild discomfort with axial loading and ulnar deviation of wrist  Tenderness along ECU tendon with no subluxation or instability  Globally nontender throughout remainder of wrist        Capillary refill brisk all fingers, symmetric  Gross sensation intact to light touch median/ulnar/radial nerves  Sensation intact to radial/ulnar aspect of fingertip        Radiology:    X-rays obtained and reviewed in office:  No new images obtained today    Additional Diagnostic Test Findings:  MRI right wrist joint without contrast  1. Low-grade ECU tendinosis and peritendinitis without tear or retraction.   Reactive osteoedema within the ulnar styloid with no wrist fractures  Personally reviewed by me, images as interpreted by [facility] radiology please see media tab for full details    Office Procedures:  No orders of the defined types were placed in this encounter. Brenda Norton MD  Orthopaedic Surgeon, [address]    Contact Information:  Adrianna Isreal: [phone] [id number] Clinical )    This dictation was performed with a verbal recognition program [facility]) and it was checked for errors. It is possible that there are still dictated errors within this office note. If so, please bring any errors to my attention for an addendum. All efforts were made to ensure that this office note is accurate.

## 2020-04-13 NOTE — PROGRESS NOTES
Results for the following test have been finalized and entered into the patients chart
under the Media tab. Vital Signs  There were no vitals filed for this visit. Physical Exam  Constitutional:  Patient is well-nourished and demonstrates normal hygiene. Mental Status:  Patient is alert and oriented to person, place and time. Skin:  Intact, no rashes or lesions. Left Wrist/left upper extremity Examination:     Inspection:  No swelling of the left ulnar wrist without skin changes or open wounds, no obvious deformity or asymmetry compared with right wrist     Palpation: Mild Focal tenderness to palpation along ECU tendon sheath as well as mild ulnar fovea tenderness and mild tenderness near pisotriquetral joint        Range of Motion:  Near symmetrical range of motion with pronation supination with 70° and 70°, extension and flexion 60° and 70° near symmetrical to contralateral wrist  Full composite fist and extension of all fingers without discomfort   There is pain with terminal supination and combined wrist extension     Strength:  5 out of 5 strength wrist flexion and extension, finger flexion-extension abduction  5 out of 5 EPL FPL and thumb abduction     Special Tests:  Positive ECU Synergy test  Stable distal radial ulnar joint in pronation supination and neutral position with mild discomfort with palpation  Mild discomfort with axial loading and ulnar deviation of wrist  Tenderness along ECU tendon with no subluxation or instability  Globally nontender throughout remainder of wrist  Skin: There are no rashes, ulcerations or lesions.         Capillary refill brisk all fingers, symmetric  Gross sensation intact to light touch median/ulnar/radial nerves  Sensation intact to radial/ulnar aspect of fingertip        Radiology:    X-rays obtained and reviewed in office:  No new images obtained today    Additional Diagnostic Test Findings:    Office Procedures:  Orders Placed This Encounter   Procedures    MRI WRIST LEFT WO CONTRAST     WORKERS COMP  MRI LEFT WRIST  R/O TFCC TEAR  ONCE

## 2022-11-01 NOTE — PROGRESS NOTES
Peyton Hickman   75 y.o. male   1955    This is patient's first visit with me. Miriam Warner is here to establish care as their new PCP. Miriam Warner has a PMH significant for:    Patient Active Problem List   Diagnosis    HTN (hypertension)    Hyperlipidemia    Sprain of left wrist    Tachycardia with hypertension    Non-English speaking patient - speaks Bosnian       Reason(s) for visit:   Chief Complaint   Patient presents with    Established New Doctor     Pt would like to have labs drawn today. Hypertension    Tachycardia     Ongoing for a couple of years. Unsure of how often. HPI:    Office visit encounter:    Patient was accompanied by his daughter today. HTN:  -CC: Patient reported of chest pain at rest, elevated BP issues and elevated HR that has been an ongoing issue since 2012. Patient stated that he notices intermittent issues of chest pain when he's stressed. and at rest.  He stated that this issue has not worsened since 2012. He stated that he may have one episode of chest pain 1-2x/month or could go over a month without chest pain. His most recent episode occurred at night at home when he was lying in bed. He experienced intense chest pressure and palpitations. He denied heartburn issues. He works as a  at school. He reported of no issues with chest pain or PERLA or inability to perform his job. No known [facility] of CAD, but sister has HTN. He stated that if he takes his Amlodipine 10 mg, he has seen high -160's and cutting it in half 5 mg he doesn't see those elevated BP readings.   He has not been to the ER let alone be hospitalized for chest pain and/or palpitation issues.    -History of MI: [] Yes [x] No  -History of TIA/ CVA: [] Yes [x] No  -Chest pain: [x] Yes [] No  -Palpitations: [x] Yes [] No  -Headache: [] Yes [] No  -SOB: [] Yes [x] No  -Dizziness: [] Yes [x] No  -Leg swelling: [] Yes [x] No  -Caffeine use: [x] Yes  -1-2 cups [] No  -Physical activity: [x] Other: work [] None   -Hx of anxiety/depression: [] Yes [x] No  -Illicit drug use: [] Yes [x] No  -Other: no echo, holter monitor, cath report on file. PDMP monitoring:  -Revealed no controlled medications written of any kind.    -Last report:   Last PDMP Kevin as Reviewed [facility]):  Review User Review Instant Review Result   [address], [place] 10/31/2022 10:42 PM Reviewed PDMP [1]       Allergies   Allergen Reactions    Aleve [Naproxen Sodium]        Current Outpatient Medications on File Prior to Visit   Medication Sig Dispense Refill    amLODIPine (NORVASC) 10 MG tablet TAKE 1 TABLET BY MOUTH EVERY DAY      atorvastatin (LIPITOR) 20 MG tablet TAKE 1 TABLET BY MOUTH EVERY DAY      ibuprofen (ADVIL;MOTRIN) 800 MG tablet Take 1 tablet by mouth every 8 hours as needed for Pain (Patient not taking: Reported on 11/4/2022) 60 tablet 0     No current facility-administered medications on file prior to visit.         Family History   Problem Relation Age of Onset    Hypertension Unknown        Social History     Tobacco Use    Smoking status: Never    Smokeless tobacco: Never   Substance Use Topics    Alcohol use: No        Lab Results   Component Value Date    WBC 5.4 04/11/2016    HGB 14.1 04/11/2016    HCT 42.6 04/11/2016    MCV 92.4 04/11/2016     04/11/2016         Chemistry        Component Value Date/Time     04/11/2016 0941    K 4.2 04/11/2016 0941     04/11/2016 0941    CO2 25 04/11/2016 0941    BUN 19 04/11/2016 0941    CREATININE 0.7 (L) 04/11/2016 0941        Component Value Date/Time    CALCIUM 8.8 04/11/2016 0941    ALKPHOS 65 04/11/2016 0941    AST 19 04/11/2016 0941    ALT 18 04/11/2016 0941    BILITOT 0.5 04/11/2016 0941          Lab Results   Component Value Date    ALT 18 04/11/2016    AST 19 04/11/2016    ALKPHOS 65 04/11/2016    BILITOT 0.5 04/11/2016       Review of Systems   Constitutional:  Negative for activity change, appetite change, fatigue, fever and unexpected weight change. HENT:  Negative for congestion, rhinorrhea, sinus pressure and trouble swallowing. Respiratory:  Negative for cough, chest tightness, shortness of breath and wheezing. Cardiovascular:  Positive for chest pain and palpitations. Negative for leg swelling. Gastrointestinal:  Negative for abdominal distention, abdominal pain, blood in stool, constipation, diarrhea, nausea and vomiting. Genitourinary:  Negative for dysuria, frequency and hematuria. Musculoskeletal:  Negative for arthralgias and back pain. Skin:  Negative for rash. Neurological:  Negative for dizziness, weakness, light-headedness, numbness and headaches. Psychiatric/Behavioral:  Negative for agitation and sleep disturbance. The patient is not nervous/anxious. Wt Readings from Last 3 Encounters:   11/04/22 158 lb 12.8 oz (72 kg)   10/13/14 154 lb (69.9 kg)       BP Readings from Last 3 Encounters:   11/04/22 116/82   03/05/19 (!) 175/87   10/13/14 146/80       Pulse Readings from Last 3 Encounters:   11/04/22 68   03/05/19 72   10/13/14 70       /82   Pulse 68   Temp 97.2 °F (36.2 °C)   Ht 5' 6\" (1.676 m)   Wt 158 lb 12.8 oz (72 kg)   SpO2 98%   BMI 25.63 kg/m²      Physical Exam  Vitals reviewed. Constitutional:       General: He is awake. He is not in acute distress. Appearance: He is overweight. He is not ill-appearing or diaphoretic. HENT:      Head: Normocephalic and atraumatic. No abrasion or masses. Hair is normal.      Right Ear: External ear normal.      Left Ear: External ear normal.      Nose: Nose normal.   Eyes:      General: Lids are normal. Gaze aligned appropriately. No scleral icterus. Right eye: No discharge. Left eye: No discharge. Extraocular Movements: Extraocular movements intact. Conjunctiva/sclera: Conjunctivae normal.   Neck:      Trachea: Phonation normal.   Cardiovascular:      Rate and Rhythm: Normal rate and regular rhythm.    Pulmonary: Effort: Pulmonary effort is normal. No respiratory distress. Breath sounds: No wheezing, rhonchi or rales. Abdominal:      General: Abdomen is flat. There is no distension. Palpations: Abdomen is soft. Musculoskeletal:         General: No deformity. Normal range of motion. Cervical back: Normal range of motion. No erythema. Right lower leg: No edema. Left lower leg: No edema. Skin:     Coloration: Skin is not cyanotic, jaundiced or pale. Findings: No abrasion, abscess, bruising, ecchymosis, erythema, signs of injury, laceration, lesion, petechiae, rash or wound. Neurological:      General: No focal deficit present. Mental Status: He is alert. Mental status is at baseline. GCS: GCS eye subscore is 4. GCS verbal subscore is 5. GCS motor subscore is 6. Cranial Nerves: No cranial nerve deficit, dysarthria or facial asymmetry. Motor: No weakness, tremor, atrophy or seizure activity. Coordination: Coordination normal.      Gait: Gait is intact. Psychiatric:         Attention and Perception: Attention and perception normal.         Mood and Affect: Mood and affect normal.         Speech: Speech normal.         Behavior: Behavior normal. Behavior is cooperative. Thought Content: Thought content normal.       Assessment/Plan:   Safet was seen today for established new doctor, hypertension and tachycardia. Diagnoses and all orders for this visit:    Encounter to establish care with new doctor    Angina at rest [facility])  Comments:  EKG in office was normal.  I recommended work-up listed below. Patient vocalized that he would like to not have a left cath. I informed him that it's not recommended to jump straight to that. Instead of a possible LHC, I informed him there's a non-invasive way to evaluate for atherosclerosis. Orders:  -     EKG 12 Lead  -     CT CARDIAC CALCIUM SCORING;  Future  -     ECHO Stress Test - Clinic Performed; Future    Tachycardia with hypertension  Comments:  I informed him pt of BP guidelines. His BP today is normal, but at home, it's uncontrolled despite taking Amlodipine. I recommended for patient to try Lotrel, which is a combination of Amlodipine + Benazepril. He declined. I informed him that there are other anti-hypertensive drug classes besides CCBs. He declined to start anything new and opted to continue Amlodipine. Orders:  -     EKG 12 Lead  -     Longterm Continuous Cardiac Event Monitor; Future  -     CBC with Auto Differential; Future  -     Comprehensive Metabolic Panel; Future  -     Lipid Panel; Future  -     TSH; Future  -     T4, Free; Future  -     CT CARDIAC CALCIUM SCORING; Future  -     ECHO Stress Test - Clinic Performed; Future  -     T4, Free  -     TSH  -     Lipid Panel  -     Comprehensive Metabolic Panel  -     CBC with Auto Differential    Atherosclerosis of arteries  -     Lipid Panel; Future  -     C-REACTIVE PROTEIN HIGH SENSITIVITY; Future  -     LIPOPROTEIN A (LPA); Future    Mixed hyperlipidemia  -     Lipid Panel; Future  -     C-REACTIVE PROTEIN HIGH SENSITIVITY; Future  -     LIPOPROTEIN A (LPA); Future  -     CT CARDIAC CALCIUM SCORING; Future  -     LIPOPROTEIN A (LPA)  -     C-REACTIVE PROTEIN HIGH SENSITIVITY  -     Lipid Panel    Hyperglycemia  -     Hemoglobin A1C; Future  -     Hemoglobin A1C    Non-English speaking patient - speaks [place]  Comments:  Daughter served as . I reviewed the plan of care with the patient. Patient acknowledged understanding and agreed with plan of care overall.     Medications Discontinued During This Encounter   Medication Reason    dexamethasone (DECADRON) 4 MG/ML injection LIST CLEANUP        General information on medications:  -When it comes to medications, whether with starting or adding a new medication or increasing the dose of a current medication, the benefits and risks have to always be considered and weighed over, especially if one is taking other medications as well. -There are no medications that have no side effects and that there is always a risk involved with taking a medication.    -If a side effect were to occur with starting a new medication or with increasing the dose of a current medication that either the medication can be totally discontinued altogether or simply decrease the dose of it and if this would be the case a follow-up appointment would be deemed necessary.    -The drug allergy list will then be updated with the corresponding side effect(s) if it's deemed to be a true 'drug allergy'. -The most common adverse effects of medication(s) were addressed at today's visit.    -Lastly, the coverage status of a medication may vary from insurance to insurance and the only way to verify if the medication is covered is to send an actual prescription in.    -The drug formulary of each insurance changes without any warning or notification to the healthcare provider let alone the pharmacy.  -The cost of medications vary from insurance to insurance and the cost is always subject to change just like the drug formulary. Follow-up: Return in about 4 weeks (around 12/2/2022) for HTN, chest pain. .     Patient was informed that if his or her symptoms worsen to follow up with me sooner or go to the nearest ER if the symptoms are very significant and warrant higher level of care. Regarding my note:  -This note was composed (by me only and not with assistance via a scribe) to the best of my knowledge and recollection of the encounter with the patient using one of my own customized note templates utilizing a combination of typing and dictating with the [address] [place] [address] speech recognition software. As a result, the note may possibly contain various errors (e.g. spelling, grammar, and non-sensible words/phrases/statements) despite reviewing the note prior to signing it for completion.       Time spent includes some or all of the following, both face-to-face time and non face-to-face time, but is not limited to:  [x] Preparing to see the patient by reviewing medical records available (notes, labs, imaging, etc.) prior to seeing the patient. [x] Obtaining and/or reviewing the history from the patient. [x] Performing a medically appropriate examination. [x] Ordering of relevant lab work, medications, referrals, or procedures. [x] Discussing patient's medical issues and formulating an assessment and plan. [x] Reviewing plan of care with patient. Answering any questions or concerns. [x] Documentation within the electronic health record (EHR)  [] Reviewing records of history relevant to patient's issues after seeing the patient. [] Discussion or coordination of care with other health care professionals  [x] Other: length of office visit: 45 minutes  -An  was used during today's visit. Care and attention was made to make a conscious effort to speak in short, comprehensible phrases. I had to (at times) repeat or rephrase what I had said to the patient and allowed ample time for both patient and  to speak without interruption. Eric Lovett M.D.   [address] [place] [address]    Electronically signed by Gustavo Gaines MD on 11/4/2022 at 11:30 AM.

## 2022-11-03 SDOH — HEALTH STABILITY: PHYSICAL HEALTH: ON AVERAGE, HOW MANY DAYS PER WEEK DO YOU ENGAGE IN MODERATE TO STRENUOUS EXERCISE (LIKE A BRISK WALK)?: 0 DAYS

## 2022-11-03 SDOH — HEALTH STABILITY: PHYSICAL HEALTH: ON AVERAGE, HOW MANY MINUTES DO YOU ENGAGE IN EXERCISE AT THIS LEVEL?: 0 MIN

## 2022-11-04 ENCOUNTER — OFFICE VISIT (OUTPATIENT)
Dept: FAMILY MEDICINE CLINIC | Age: 67
End: 2022-11-04
Payer: MEDICARE

## 2022-11-04 VITALS
TEMPERATURE: 97.2 F | WEIGHT: 158.8 LBS | HEART RATE: 68 BPM | HEIGHT: 66 IN | BODY MASS INDEX: 25.52 KG/M2 | SYSTOLIC BLOOD PRESSURE: 116 MMHG | DIASTOLIC BLOOD PRESSURE: 82 MMHG | OXYGEN SATURATION: 98 %

## 2022-11-04 DIAGNOSIS — I70.90 ATHEROSCLEROSIS OF ARTERIES: ICD-10-CM

## 2022-11-04 DIAGNOSIS — E78.2 MIXED HYPERLIPIDEMIA: ICD-10-CM

## 2022-11-04 DIAGNOSIS — I20.8 ANGINA AT REST (HCC): ICD-10-CM

## 2022-11-04 DIAGNOSIS — I10 TACHYCARDIA WITH HYPERTENSION: ICD-10-CM

## 2022-11-04 DIAGNOSIS — Z78.9 NON-ENGLISH SPEAKING PATIENT: ICD-10-CM

## 2022-11-04 DIAGNOSIS — Z76.89 ENCOUNTER TO ESTABLISH CARE WITH NEW DOCTOR: Primary | ICD-10-CM

## 2022-11-04 DIAGNOSIS — R73.9 HYPERGLYCEMIA: ICD-10-CM

## 2022-11-04 DIAGNOSIS — R00.0 TACHYCARDIA WITH HYPERTENSION: ICD-10-CM

## 2022-11-04 LAB
A/G RATIO: 1.7 (ref 1.1–2.2)
ALBUMIN SERPL-MCNC: 4 G/DL (ref 3.4–5)
ALP BLD-CCNC: 74 U/L (ref 40–129)
ALT SERPL-CCNC: 26 U/L (ref 10–40)
ANION GAP SERPL CALCULATED.3IONS-SCNC: 11 MMOL/L (ref 3–16)
AST SERPL-CCNC: 20 U/L (ref 15–37)
BASOPHILS ABSOLUTE: 0 K/UL (ref 0–0.2)
BASOPHILS RELATIVE PERCENT: 0.6 %
BILIRUB SERPL-MCNC: 0.4 MG/DL (ref 0–1)
BUN BLDV-MCNC: 19 MG/DL (ref 7–20)
C-REACTIVE PROTEIN, HIGH SENSITIVITY: 3.92 MG/L (ref 0.16–3)
CALCIUM SERPL-MCNC: 9 MG/DL (ref 8.3–10.6)
CHLORIDE BLD-SCNC: 100 MMOL/L (ref 99–110)
CHOLESTEROL, TOTAL: 129 MG/DL (ref 0–199)
CO2: 26 MMOL/L (ref 21–32)
CREAT SERPL-MCNC: 0.8 MG/DL (ref 0.8–1.3)
EOSINOPHILS ABSOLUTE: 0.2 K/UL (ref 0–0.6)
EOSINOPHILS RELATIVE PERCENT: 3.4 %
GFR SERPL CREATININE-BSD FRML MDRD: >60 ML/MIN/{1.73_M2}
GLUCOSE BLD-MCNC: 100 MG/DL (ref 70–99)
HCT VFR BLD CALC: 43.4 % (ref 40.5–52.5)
HDLC SERPL-MCNC: 34 MG/DL (ref 40–60)
HEMOGLOBIN: 14.4 G/DL (ref 13.5–17.5)
LDL CHOLESTEROL CALCULATED: 76 MG/DL
LYMPHOCYTES ABSOLUTE: 1.5 K/UL (ref 1–5.1)
LYMPHOCYTES RELATIVE PERCENT: 21.6 %
MCH RBC QN AUTO: 32 PG (ref 26–34)
MCHC RBC AUTO-ENTMCNC: 33.2 G/DL (ref 31–36)
MCV RBC AUTO: 96.5 FL (ref 80–100)
MONOCYTES ABSOLUTE: 0.5 K/UL (ref 0–1.3)
MONOCYTES RELATIVE PERCENT: 8.1 %
NEUTROPHILS ABSOLUTE: 4.5 K/UL (ref 1.7–7.7)
NEUTROPHILS RELATIVE PERCENT: 66.3 %
PDW BLD-RTO: 13.5 % (ref 12.4–15.4)
PLATELET # BLD: 271 K/UL (ref 135–450)
PMV BLD AUTO: 7.9 FL (ref 5–10.5)
POTASSIUM SERPL-SCNC: 4.4 MMOL/L (ref 3.5–5.1)
RBC # BLD: 4.49 M/UL (ref 4.2–5.9)
SODIUM BLD-SCNC: 137 MMOL/L (ref 136–145)
T4 FREE: 1.3 NG/DL (ref 0.9–1.8)
TOTAL PROTEIN: 6.4 G/DL (ref 6.4–8.2)
TRIGL SERPL-MCNC: 96 MG/DL (ref 0–150)
TSH SERPL DL<=0.05 MIU/L-ACNC: 0.8 UIU/ML (ref 0.27–4.2)
VLDLC SERPL CALC-MCNC: 19 MG/DL
WBC # BLD: 6.7 K/UL (ref 4–11)

## 2022-11-04 PROCEDURE — 3017F COLORECTAL CA SCREEN DOC REV: CPT | Performed by: FAMILY MEDICINE

## 2022-11-04 PROCEDURE — 99204 OFFICE O/P NEW MOD 45 MIN: CPT | Performed by: FAMILY MEDICINE

## 2022-11-04 PROCEDURE — 3079F DIAST BP 80-89 MM HG: CPT | Performed by: FAMILY MEDICINE

## 2022-11-04 PROCEDURE — G8427 DOCREV CUR MEDS BY ELIG CLIN: HCPCS | Performed by: FAMILY MEDICINE

## 2022-11-04 PROCEDURE — 1036F TOBACCO NON-USER: CPT | Performed by: FAMILY MEDICINE

## 2022-11-04 PROCEDURE — G8417 CALC BMI ABV UP PARAM F/U: HCPCS | Performed by: FAMILY MEDICINE

## 2022-11-04 PROCEDURE — 93000 ELECTROCARDIOGRAM COMPLETE: CPT | Performed by: FAMILY MEDICINE

## 2022-11-04 PROCEDURE — 1123F ACP DISCUSS/DSCN MKR DOCD: CPT | Performed by: FAMILY MEDICINE

## 2022-11-04 PROCEDURE — G8484 FLU IMMUNIZE NO ADMIN: HCPCS | Performed by: FAMILY MEDICINE

## 2022-11-04 PROCEDURE — 3074F SYST BP LT 130 MM HG: CPT | Performed by: FAMILY MEDICINE

## 2022-11-04 RX ORDER — ATORVASTATIN CALCIUM 20 MG/1
TABLET, FILM COATED ORAL
COMMUNITY
Start: 2022-10-12

## 2022-11-04 RX ORDER — AMLODIPINE BESYLATE 10 MG/1
TABLET ORAL
COMMUNITY
Start: 2022-10-12

## 2022-11-04 ASSESSMENT — ENCOUNTER SYMPTOMS
COUGH: 0
RHINORRHEA: 0
SHORTNESS OF BREATH: 0
BLOOD IN STOOL: 0
WHEEZING: 0
CHEST TIGHTNESS: 0
ABDOMINAL DISTENTION: 0
ABDOMINAL PAIN: 0
SINUS PRESSURE: 0
TROUBLE SWALLOWING: 0
BACK PAIN: 0
DIARRHEA: 0
CONSTIPATION: 0
NAUSEA: 0
VOMITING: 0

## 2022-11-04 ASSESSMENT — PATIENT HEALTH QUESTIONNAIRE - PHQ9
SUM OF ALL RESPONSES TO PHQ QUESTIONS 1-9: 0
SUM OF ALL RESPONSES TO PHQ9 QUESTIONS 1 & 2: 0
SUM OF ALL RESPONSES TO PHQ QUESTIONS 1-9: 0
1. LITTLE INTEREST OR PLEASURE IN DOING THINGS: 0
2. FEELING DOWN, DEPRESSED OR HOPELESS: 0

## 2022-11-05 LAB
ESTIMATED AVERAGE GLUCOSE: 108.3 MG/DL
HBA1C MFR BLD: 5.4 %

## 2022-11-07 LAB — LIPOPROTEIN (A): <6 MG/DL
